# Patient Record
Sex: MALE | Race: WHITE | NOT HISPANIC OR LATINO | Employment: UNEMPLOYED | URBAN - NONMETROPOLITAN AREA
[De-identification: names, ages, dates, MRNs, and addresses within clinical notes are randomized per-mention and may not be internally consistent; named-entity substitution may affect disease eponyms.]

---

## 2023-08-09 ENCOUNTER — TELEPHONE (OUTPATIENT)
Dept: SURGERY | Facility: CLINIC | Age: 33
End: 2023-08-09

## 2023-08-09 ENCOUNTER — OFFICE VISIT (OUTPATIENT)
Dept: SURGERY | Facility: CLINIC | Age: 33
End: 2023-08-09
Payer: COMMERCIAL

## 2023-08-09 VITALS
WEIGHT: 217 LBS | BODY MASS INDEX: 29.39 KG/M2 | SYSTOLIC BLOOD PRESSURE: 110 MMHG | DIASTOLIC BLOOD PRESSURE: 70 MMHG | HEART RATE: 69 BPM | HEIGHT: 72 IN

## 2023-08-09 DIAGNOSIS — L72.3 SEBACEOUS CYST: Primary | ICD-10-CM

## 2023-08-09 DIAGNOSIS — L72.0 EPIDERMOID CYST: Primary | ICD-10-CM

## 2023-08-09 RX ORDER — OXYCODONE HYDROCHLORIDE 5 MG/1
5 TABLET ORAL EVERY 6 HOURS PRN
Qty: 5 TABLET | Refills: 0 | Status: SHIPPED | OUTPATIENT
Start: 2023-08-09

## 2023-08-09 NOTE — PROGRESS NOTES
"Date of Service: 8/9/2023    Subjective   Ming Senior is a 32 y.o. male is being in consultation today at the request of Amanda Prado APRN    Chief Complaint: back cyst    Ming Senior is a 32 y.o. male who presents with a cyst on his back. He has noticed there for a year but several weeks ago it became swollen and then popped. He has not noticed any redness or drainage since then. He is interested in removal today.     History reviewed. No pertinent past medical history.    Past Surgical History:   Procedure Laterality Date    TONGUE SURGERY           History reviewed. No pertinent family history.      Social History     Socioeconomic History    Marital status: Single   Tobacco Use    Smoking status: Never     Passive exposure: Never    Smokeless tobacco: Never   Vaping Use    Vaping Use: Never used   Substance and Sexual Activity    Alcohol use: Never    Drug use: Never    Sexual activity: Defer                Review of Systems   Pertinent items are noted in HPI     Constitutional: No fevers, chills or malaise. No unintentional weight loss   Eyes: Denies visual changes    Cardiovascular: Denies chest pain, palpitations   Respiratory: Denies cough or shortness of breath   Abdominal/Gastrointestinal: No abdominal pain, no nausea/vomiting   Genitourinary: Denies dysuria or hematuria   Musculoskeletal: Denies any chronic joint aches, pains or deformities              Skin: No lesions or rashes   Psychiatric: No recent mood changes   Neurologic: No paresthesias or loss of function        Objective       Physical Exam:      08/09/23  1019   Weight: 98.4 kg (217 lb)    Body mass index is 29.43 kg/mý.  Constitution: /70 (BP Location: Right arm, Patient Position: Sitting, Cuff Size: Adult)   Pulse 69   Ht 182.9 cm (72\")   Wt 98.4 kg (217 lb)   BMI 29.43 kg/mý  . No acute distress  Head: Normocephalic, atraumatic.   Eyes: Aligned without strabismus. Conjunctivae noninjected   Ears, Nose, Mouth: " Normal dentition, no lesions noted  CV: Regular rate and rhythm   Respiratory: Symmetric chest expansion. No respiratory distress.   Gastrointestinal:  Soft, nontender, nondistended   Skin:  No cyanosis, clubbing or edema bilaterally  Lymphatics: No abnormal cervical or supraclavicular adenopathy  Neurologic: No gross deficits.  Alert and oriented x3  Psychiatric: Normal mood          Assessment   Problems Addressed this Visit    None  Visit Diagnoses       Epidermoid cyst    -  Primary    Relevant Medications    oxyCODONE (Roxicodone) 5 MG immediate release tablet          Diagnoses         Codes Comments    Epidermoid cyst    -  Primary ICD-10-CM: L72.0  ICD-9-CM: 706.2           IN-OFFICE PROCEDURE NOTE    Patient Name:  Ming Senior  YOB: 1990  [id number]    8/9/2023      PREOPERATIVE DIAGNOSIS: symptomatic left back cyst       POSTOPERATIVE DIAGNOSIS: Same       PROCEDURE PERFORMED: Left back cyst excision        SURGEON: Aniya Loza MD        SPECIMENS: Left back cyst       ANESTHESIA: Local anesthetic (lidocaine with epinephrine)       FINDINGS:   Simple epidermal inclusion cyst noted        INDICATIONS:    The patient is a 32 y.o. male w/ a symptomatic left back cyst requesting removal. Consent obtained and in office procedure planned.      DESCRIPTION OF PROCEDURE:     The patient was in the prone position. He was prepped and draped in a sterile fashion. I began with an elliptical incision surrounding the cyst opening. The incision measured 1.5cm  long x 2mm wide. The cyst tract was excised in it's entirety down to the base. There was a small amount of spillage from the cyst. The wound was irrigated and pressure was held for hemostasis. The wound was clean and hemostatic at the end of the procedure. The wound was closed with three 3-0 nylon sutures and covered with a bandaid.      Specimen sent for pathologic evaluation.     Aniya Loza MD  8/9/2023  11:17 EDT      Ming Senior  is a 32 y.o. male with a symptomatic left back cyst s/p removal today. It was well tolerated in clinic. We will send the cyst for pathology. He will need to return to clinic in one month for suture removal. I have advised taking over the counter acetaminophen and ibuprofen, but I have sent #5 tablets of oxycodone to his home pharmacy if needed for additional pain control. I will call him with the pathologic findings.          Aniya Loza MD  [facility]

## 2023-08-10 LAB — REF LAB TEST METHOD: NORMAL

## 2023-08-11 ENCOUNTER — TELEPHONE (OUTPATIENT)
Dept: SURGERY | Facility: CLINIC | Age: 33
End: 2023-08-11

## 2023-08-11 NOTE — TELEPHONE ENCOUNTER
I have attempted to call patient x2 to discuss pathology results (benign lesion on back).   We will discuss upon return to clinic for suture removal.     Aniya Jacob MD     General Surgeon  [facility]

## 2023-09-05 ENCOUNTER — OFFICE VISIT (OUTPATIENT)
Dept: SURGERY | Facility: CLINIC | Age: 33
End: 2023-09-05
Payer: COMMERCIAL

## 2023-09-05 VITALS — BODY MASS INDEX: 29.39 KG/M2 | HEIGHT: 72 IN | WEIGHT: 217 LBS

## 2023-09-05 DIAGNOSIS — L72.0 EPIDERMOID CYST OF SKIN OF BACK: Primary | ICD-10-CM

## 2023-09-05 PROCEDURE — 1159F MED LIST DOCD IN RCRD: CPT | Performed by: SURGERY

## 2023-09-05 PROCEDURE — 1160F RVW MEDS BY RX/DR IN RCRD: CPT | Performed by: SURGERY

## 2023-09-05 PROCEDURE — 99024 POSTOP FOLLOW-UP VISIT: CPT | Performed by: SURGERY

## 2023-09-05 NOTE — PROGRESS NOTES
"Patient Name:  Ming Senior  YOB: 1990  [id number]    Follow Up Note    Date of visit: 9/5/2023    Subjective   Subjective: Mr Senior presents for follow up after skin lesion excision on 8/9/23. He is doing well with no complaints.          Objective     Objective:     Ht 182.9 cm (72.01\")   Wt 98.4 kg (217 lb)   BMI 29.42 kg/m²       CV:  Regular rate and rhythm  L:  Bilateral lung rise and fall, no use of accessory muscles   Abd:  Soft, nontender, nondistended  Ext:  No cyanosis, clubbing, edema  Skin:    Well healed incision with nylons in place       Assessment/ Plan:      ICD-10-CM ICD-9-CM   1. Epidermoid cyst of skin of back  L72.0 706.2     Mr Senior presents for follow up of epidermoid skin cyst of his back. He is well healed without issue. Nylon sutures removed today. Benign pathology reviewed with patient.     Aniya Temple MD       General Surgeon  [facility]       "